# Patient Record
Sex: FEMALE | URBAN - NONMETROPOLITAN AREA
[De-identification: names, ages, dates, MRNs, and addresses within clinical notes are randomized per-mention and may not be internally consistent; named-entity substitution may affect disease eponyms.]

---

## 2022-01-13 ENCOUNTER — APPOINTMENT (RX ONLY)
Dept: URBAN - NONMETROPOLITAN AREA CLINIC 8 | Facility: CLINIC | Age: 72
Setting detail: DERMATOLOGY
End: 2022-01-13

## 2022-01-13 DIAGNOSIS — Z41.9 ENCOUNTER FOR PROCEDURE FOR PURPOSES OTHER THAN REMEDYING HEALTH STATE, UNSPECIFIED: ICD-10-CM

## 2022-01-13 PROCEDURE — ? CHEMICAL PEEL

## 2022-01-13 ASSESSMENT — LOCATION DETAILED DESCRIPTION DERM: LOCATION DETAILED: INFERIOR MID FOREHEAD

## 2022-01-13 ASSESSMENT — LOCATION SIMPLE DESCRIPTION DERM: LOCATION SIMPLE: INFERIOR FOREHEAD

## 2022-01-13 ASSESSMENT — LOCATION ZONE DERM: LOCATION ZONE: FACE

## 2022-01-13 NOTE — PROCEDURE: CHEMICAL PEEL
Post Peel Care: Patient tolerated the treatment well. After the Micro Peel was applied, detailed post-care instructions were reviewed. The patient was instructed to use moisturizers and sunscreen.
Treatment Number: 1
Prep: The treated area was degreased with pre-peel cleanser, and vaseline was applied for protection of mucous membranes.  \\n\\nMicropeel 30% Solution was applied to a clean and degreased face with 2x2 soft gauze and left on for 2 minutes, then removed with peel neutralizer and cool water. Applied Phyto Corrective Masque to dry skin for 10 minutes and removed with warm water. Applied C E Ferulic, Triple Lipid, and Physical Fusion UV Defense SPF 50.
Consent: Prior to the procedure, consent was obtained and risks were reviewed, including but not limited to: redness, peeling, blistering, pigmentary change, scarring, infection, and pain.
Detail Level: Zone
Chemical Peel: GA 30%

## 2022-02-10 ENCOUNTER — APPOINTMENT (RX ONLY)
Dept: URBAN - NONMETROPOLITAN AREA CLINIC 8 | Facility: CLINIC | Age: 72
Setting detail: DERMATOLOGY
End: 2022-02-10

## 2022-02-10 DIAGNOSIS — Z41.9 ENCOUNTER FOR PROCEDURE FOR PURPOSES OTHER THAN REMEDYING HEALTH STATE, UNSPECIFIED: ICD-10-CM

## 2022-02-10 PROCEDURE — ? CHEMICAL PEEL

## 2022-02-10 ASSESSMENT — LOCATION SIMPLE DESCRIPTION DERM: LOCATION SIMPLE: LEFT FOREHEAD

## 2022-02-10 ASSESSMENT — LOCATION DETAILED DESCRIPTION DERM: LOCATION DETAILED: LEFT INFERIOR MEDIAL FOREHEAD

## 2022-02-10 ASSESSMENT — LOCATION ZONE DERM: LOCATION ZONE: FACE

## 2022-02-10 NOTE — PROCEDURE: CHEMICAL PEEL
Prep: The treated area was degreased with pre-peel cleanser, and vaseline was applied for protection of mucous membranes.  \\n\\nPigment Balancing Peel was applied to a clean and degreased face with 2x2 soft gauze and left on for 2 minutes, then removed with peel neutralizer and cool water. Applied Raymon Masque to dry skin for 5 minutes and removed with steamed towels. Applied Biocellulose Restorative Masque for 5 minutes, then removed with cool gauze. Applied Phloretin CF, Triple Lipid, and Physical Fusion UV Defense SPF 50.
Post Peel Care: Patient tolerated the treatment well. After the Micro Peel was applied, detailed post-care instructions were reviewed. The patient was instructed to use moisturizers and sunscreen
Chemical Peel: Pigment Balancing
Detail Level: Zone
Treatment Number: 3
Post-Care Instructions: I reviewed with the patient in detail post-care instructions. Patient should avoid sun exposure and wear sun protection.
Consent: Prior to the procedure, consent was obtained and risks were reviewed, including but not limited to: redness, peeling, blistering, pigmentary change, scarring, infection, and pain.

## 2022-03-31 ENCOUNTER — APPOINTMENT (RX ONLY)
Dept: URBAN - NONMETROPOLITAN AREA CLINIC 8 | Facility: CLINIC | Age: 72
Setting detail: DERMATOLOGY
End: 2022-03-31

## 2022-03-31 DIAGNOSIS — Z41.9 ENCOUNTER FOR PROCEDURE FOR PURPOSES OTHER THAN REMEDYING HEALTH STATE, UNSPECIFIED: ICD-10-CM

## 2022-03-31 PROCEDURE — ? CHEMICAL PEEL

## 2022-03-31 ASSESSMENT — LOCATION DETAILED DESCRIPTION DERM: LOCATION DETAILED: LEFT INFERIOR MEDIAL FOREHEAD

## 2022-03-31 ASSESSMENT — LOCATION ZONE DERM: LOCATION ZONE: FACE

## 2022-03-31 ASSESSMENT — LOCATION SIMPLE DESCRIPTION DERM: LOCATION SIMPLE: LEFT FOREHEAD

## 2022-05-05 ENCOUNTER — APPOINTMENT (RX ONLY)
Dept: URBAN - NONMETROPOLITAN AREA CLINIC 8 | Facility: CLINIC | Age: 72
Setting detail: DERMATOLOGY
End: 2022-05-05

## 2022-05-05 DIAGNOSIS — Z41.9 ENCOUNTER FOR PROCEDURE FOR PURPOSES OTHER THAN REMEDYING HEALTH STATE, UNSPECIFIED: ICD-10-CM

## 2022-05-05 PROCEDURE — ? CHEMICAL PEEL

## 2022-05-05 ASSESSMENT — LOCATION SIMPLE DESCRIPTION DERM: LOCATION SIMPLE: LEFT FOREHEAD

## 2022-05-05 ASSESSMENT — LOCATION DETAILED DESCRIPTION DERM: LOCATION DETAILED: LEFT INFERIOR MEDIAL FOREHEAD

## 2022-05-05 ASSESSMENT — LOCATION ZONE DERM: LOCATION ZONE: FACE

## 2022-05-05 NOTE — PROCEDURE: CHEMICAL PEEL
Prep: The treated area was degreased with pre-peel cleanser, and vaseline was applied for protection of mucous membranes.  \\n\\nPigment Balancing Peel was applied to a clean and degreased face with 2x2 soft gauze and left on for 2 minutes, then removed with peel neutralizer and cool water. Applied Clarifying Raymon Masque to dry skin for 5 minutes and removed with steamed towels. Applied Biocellulose Restorative Masque for 5 minutes, then removed with cool gauze. Applied Phloretin CF, Triple Lipid, and Physical Fusion UV Defense SPF 50.
Detail Level: Zone
Chemical Peel: Pigment Balancing
Consent: Prior to the procedure, consent was obtained and risks were reviewed, including but not limited to: redness, peeling, blistering, pigmentary change, scarring, infection, and pain.
Post Peel Care: Patient tolerated the treatment well. After the Micro Peel was applied, detailed post-care instructions were reviewed. The patient was instructed to use moisturizers and sunscreen
Treatment Number: 5
Post-Care Instructions: I reviewed with the patient in detail post-care instructions. Patient should avoid sun exposure and wear sun protection.

## 2022-06-09 ENCOUNTER — APPOINTMENT (RX ONLY)
Dept: URBAN - NONMETROPOLITAN AREA CLINIC 8 | Facility: CLINIC | Age: 72
Setting detail: DERMATOLOGY
End: 2022-06-09

## 2022-06-09 DIAGNOSIS — Z41.9 ENCOUNTER FOR PROCEDURE FOR PURPOSES OTHER THAN REMEDYING HEALTH STATE, UNSPECIFIED: ICD-10-CM

## 2022-06-09 PROCEDURE — ? CHEMICAL PEEL

## 2022-06-09 ASSESSMENT — LOCATION DETAILED DESCRIPTION DERM: LOCATION DETAILED: LEFT INFERIOR MEDIAL FOREHEAD

## 2022-06-09 ASSESSMENT — LOCATION ZONE DERM: LOCATION ZONE: FACE

## 2022-06-09 ASSESSMENT — LOCATION SIMPLE DESCRIPTION DERM: LOCATION SIMPLE: LEFT FOREHEAD

## 2022-06-09 NOTE — PROCEDURE: CHEMICAL PEEL
Detail Level: Zone
Consent: Prior to the procedure, consent was obtained and risks were reviewed, including but not limited to: redness, peeling, blistering, pigmentary change, scarring, infection, and pain.
Post-Care Instructions: I reviewed with the patient in detail post-care instructions. Patient should avoid sun exposure and wear sun protection.
Post Peel Care: Patient tolerated the treatment well. After the Micro Peel was applied, detailed post-care instructions were reviewed. The patient was instructed to use moisturizers and sunscreen
Prep: The treated area was degreased with pre-peel cleanser, and vaseline was applied for protection of mucous membranes.  \\n\\nPigment Balancing Peel was applied to a clean and degreased face with 2x2 soft gauze and left on for 2 minutes, then removed with peel neutralizer and cool water. Applied Vitamin C Firming Masque to dry skin for 5 minutes and removed with steamed towels. Applied Biocellulose Restorative Masque for 5 minutes, then removed with cool gauze. Applied Phloretin CF, Triple Lipid, and Physical Fusion UV Defense SPF 50.
Treatment Number: 6
Chemical Peel: Pigment Balancing